# Patient Record
Sex: FEMALE | Race: WHITE | NOT HISPANIC OR LATINO | Employment: OTHER | URBAN - METROPOLITAN AREA
[De-identification: names, ages, dates, MRNs, and addresses within clinical notes are randomized per-mention and may not be internally consistent; named-entity substitution may affect disease eponyms.]

---

## 2017-03-22 ENCOUNTER — OFFICE VISIT (OUTPATIENT)
Dept: ORTHOPEDIC SURGERY | Facility: CLINIC | Age: 82
End: 2017-03-22

## 2017-03-22 VITALS — WEIGHT: 152 LBS | BODY MASS INDEX: 25.95 KG/M2 | HEIGHT: 64 IN

## 2017-03-22 DIAGNOSIS — M17.12 PRIMARY OSTEOARTHRITIS OF LEFT KNEE: Primary | ICD-10-CM

## 2017-03-22 PROCEDURE — 20610 DRAIN/INJ JOINT/BURSA W/O US: CPT | Performed by: ORTHOPAEDIC SURGERY

## 2017-03-22 RX ORDER — TRIAMTERENE AND HYDROCHLOROTHIAZIDE 37.5; 25 MG/1; MG/1
TABLET ORAL
Refills: 3 | COMMUNITY
Start: 2017-01-19

## 2017-03-22 RX ORDER — METOPROLOL SUCCINATE 50 MG/1
TABLET, EXTENDED RELEASE ORAL
Refills: 3 | COMMUNITY
Start: 2017-01-13

## 2017-03-22 RX ADMIN — BUPIVACAINE HYDROCHLORIDE 4 ML: 5 INJECTION, SOLUTION EPIDURAL; INTRACAUDAL at 13:47

## 2017-03-22 RX ADMIN — LIDOCAINE HYDROCHLORIDE 4 ML: 10 INJECTION, SOLUTION INFILTRATION; PERINEURAL at 13:47

## 2017-03-22 RX ADMIN — TRIAMCINOLONE ACETONIDE 80 MG: 40 INJECTION, SUSPENSION INTRA-ARTICULAR; INTRAMUSCULAR at 13:47

## 2017-04-04 RX ORDER — BUPIVACAINE HYDROCHLORIDE 5 MG/ML
4 INJECTION, SOLUTION EPIDURAL; INTRACAUDAL
Status: COMPLETED | OUTPATIENT
Start: 2017-03-22 | End: 2017-03-22

## 2017-04-04 RX ORDER — TRIAMCINOLONE ACETONIDE 40 MG/ML
80 INJECTION, SUSPENSION INTRA-ARTICULAR; INTRAMUSCULAR
Status: COMPLETED | OUTPATIENT
Start: 2017-03-22 | End: 2017-03-22

## 2017-04-04 RX ORDER — LIDOCAINE HYDROCHLORIDE 10 MG/ML
4 INJECTION, SOLUTION INFILTRATION; PERINEURAL
Status: COMPLETED | OUTPATIENT
Start: 2017-03-22 | End: 2017-03-22

## 2017-06-14 ENCOUNTER — OFFICE VISIT (OUTPATIENT)
Dept: ORTHOPEDIC SURGERY | Facility: CLINIC | Age: 82
End: 2017-06-14

## 2017-06-14 VITALS — WEIGHT: 154 LBS | BODY MASS INDEX: 26.29 KG/M2 | HEIGHT: 64 IN

## 2017-06-14 DIAGNOSIS — M17.12 PRIMARY OSTEOARTHRITIS OF LEFT KNEE: ICD-10-CM

## 2017-06-14 DIAGNOSIS — R52 PAIN: Primary | ICD-10-CM

## 2017-06-14 PROCEDURE — 73562 X-RAY EXAM OF KNEE 3: CPT | Performed by: ORTHOPAEDIC SURGERY

## 2017-06-14 PROCEDURE — 20610 DRAIN/INJ JOINT/BURSA W/O US: CPT | Performed by: ORTHOPAEDIC SURGERY

## 2017-06-14 PROCEDURE — 99213 OFFICE O/P EST LOW 20 MIN: CPT | Performed by: ORTHOPAEDIC SURGERY

## 2017-06-14 RX ADMIN — TRIAMCINOLONE ACETONIDE 80 MG: 40 INJECTION, SUSPENSION INTRA-ARTICULAR; INTRAMUSCULAR at 13:54

## 2017-06-14 RX ADMIN — BUPIVACAINE HYDROCHLORIDE 4 ML: 5 INJECTION, SOLUTION EPIDURAL; INTRACAUDAL at 13:54

## 2017-06-14 RX ADMIN — LIDOCAINE HYDROCHLORIDE 4 ML: 10 INJECTION, SOLUTION INFILTRATION; PERINEURAL at 13:54

## 2017-06-14 NOTE — PROGRESS NOTES
Subjective:     Patient ID: Coby Castañeda is a 89 y.o. female.    Chief Complaint:  Follow-up left knee pain, DJD  History of Present Illness  Coby Castañeda returns to clinic today for evaluation of left knee, last saw her back in March and injected her knee at that time, states that she still has gotten 90% relief of her pain for approximately 2 months that time but pain has significant recurred since then.  Rates currently as an 8-9 out of 10, aching in nature, localized anterior lateral aspects of left knee.  Exacerbated with prolonged standing, weightbearing, walking, minimal improvement with rest.  She has been having some falls recently and is thus, started into physical therapy program 5 weeks ago and she and her daughter have noted improvement in her strength, decreased pain, and decreased falls.      Social History     Occupational History   • Not on file.     Social History Main Topics   • Smoking status: Never Smoker   • Smokeless tobacco: Never Used   • Alcohol use No   • Drug use: No   • Sexual activity: Defer      Past Medical History:   Diagnosis Date   • Fracture of wrist    • Fracture, femur    • Heart failure    • Knee swelling    • Myocardial infarction      Past Surgical History:   Procedure Laterality Date   • ADENOIDECTOMY     • FRACTURE SURGERY     • PACEMAKER IMPLANTATION     • TONSILLECTOMY         Family History   Problem Relation Age of Onset   • Heart failure Mother          Review of Systems   Constitutional: Negative for chills, diaphoresis, fever and unexpected weight change.   HENT: Negative for hearing loss, nosebleeds, sore throat and tinnitus.    Eyes: Negative for pain and visual disturbance.   Respiratory: Negative for cough, shortness of breath and wheezing.    Cardiovascular: Negative for chest pain and palpitations.   Gastrointestinal: Negative for abdominal pain, diarrhea, nausea and vomiting.   Endocrine: Negative for cold intolerance, heat intolerance and polydipsia.  "  Genitourinary: Negative for difficulty urinating, dysuria and hematuria.   Musculoskeletal: Positive for arthralgias and myalgias. Negative for joint swelling.   Skin: Negative for rash and wound.   Allergic/Immunologic: Negative for environmental allergies.   Neurological: Negative for dizziness, syncope and numbness.   Hematological: Does not bruise/bleed easily.   Psychiatric/Behavioral: Negative for dysphoric mood and sleep disturbance. The patient is not nervous/anxious.            Objective:  Vitals:    06/14/17 1327   Weight: 154 lb (69.9 kg)   Height: 64\" (162.6 cm)     Last 2 weights    06/14/17  1327   Weight: 154 lb (69.9 kg)     Body mass index is 26.43 kg/(m^2).  General: No acute distress.  Resp: normal respiratory effort  Skin: no rashes or wounds; normal turgor  Psych: mood and affect appropriate; recent and remote memory intact         Ortho Exam    Left knee-active range of motion 5-120°, 4+ out of 5 strength on flexion and extension. Moderate effusion noted. Maximal tenderness palpation along lateral joint line, moderate tenderness along medial and lateral patellar facet with positive active patellar compression test. Stable to varus and valgus stress at 5 and 30°.   Imaging:  Left Knee X-Ray  Indication: Pain    AP, Lateral, and Milner views    Findings:  No fracture  No bony lesion  Normal soft tissues  Valgus alignment with advanced degenerative change intracompartmental osteoarthritis, bone-on-bone articulation of patellofemoral and lateral compartments noted, osteopenia noted and distal femur as well as proximal tibia.     No prior studies were available for comparison.    Assessment:       1. Pain    2. Primary osteoarthritis of left knee          Plan:  Large Joint Arthrocentesis  Date/Time: 6/14/2017 1:54 PM  Consent given by: patient  Site marked: site marked  Timeout: Immediately prior to procedure a time out was called to verify the correct patient, procedure, equipment, support " staff and site/side marked as required   Supporting Documentation  Indications: pain   Procedure Details  Location: knee - L knee  Preparation: Patient was prepped and draped in the usual sterile fashion  Needle size: 22 G  Approach: superior  Medications administered: 4 mL bupivacaine (PF) 0.5 %; 4 mL lidocaine 1 %; 80 mg triamcinolone acetonide 40 MG/ML  Patient tolerance: patient tolerated the procedure well with no immediate complications          ALEJANDRO query complete.          Discussed treatment options at length with patient at today's visit. Patient is not interested in any further surgical treatment at this point time, she has concerns about her medical risk.  She was to continue with physical therapy, given prior improvement with cortisone injection we'll proceed with that today.  We may also consider viscous supplement injections in the future if cortisone continues to decrease in efficacy.    Coby Castañeda was in agreement with plan and had all questions answered.     Orders:  Orders Placed This Encounter   Procedures   • Large Joint Arthrocentesis   • XR Knee 3 View Left       Medications:  No orders of the defined types were placed in this encounter.      Followup:  Return in about 3 months (around 9/14/2017).          Dragon transcription disclaimer     Much of this encounter note is an electronic transcription/translation of spoken language to printed text. The electronic translation of spoken language may permit erroneous, or at times, nonsensical words or phrases to be inadvertently transcribed. Although I have reviewed the note for such errors, some may still exist.

## 2017-06-18 RX ORDER — LIDOCAINE HYDROCHLORIDE 10 MG/ML
4 INJECTION, SOLUTION INFILTRATION; PERINEURAL
Status: COMPLETED | OUTPATIENT
Start: 2017-06-14 | End: 2017-06-14

## 2017-06-18 RX ORDER — BUPIVACAINE HYDROCHLORIDE 5 MG/ML
4 INJECTION, SOLUTION EPIDURAL; INTRACAUDAL
Status: COMPLETED | OUTPATIENT
Start: 2017-06-14 | End: 2017-06-14

## 2017-06-18 RX ORDER — TRIAMCINOLONE ACETONIDE 40 MG/ML
80 INJECTION, SUSPENSION INTRA-ARTICULAR; INTRAMUSCULAR
Status: COMPLETED | OUTPATIENT
Start: 2017-06-14 | End: 2017-06-14

## 2017-09-13 ENCOUNTER — OFFICE VISIT (OUTPATIENT)
Dept: ORTHOPEDIC SURGERY | Facility: CLINIC | Age: 82
End: 2017-09-13

## 2017-09-13 DIAGNOSIS — M17.12 PRIMARY OSTEOARTHRITIS OF LEFT KNEE: ICD-10-CM

## 2017-09-13 DIAGNOSIS — M70.61 TROCHANTERIC BURSITIS OF RIGHT HIP: ICD-10-CM

## 2017-09-13 DIAGNOSIS — R52 PAIN: Primary | ICD-10-CM

## 2017-09-13 PROCEDURE — 20610 DRAIN/INJ JOINT/BURSA W/O US: CPT | Performed by: ORTHOPAEDIC SURGERY

## 2017-09-13 PROCEDURE — 73502 X-RAY EXAM HIP UNI 2-3 VIEWS: CPT | Performed by: ORTHOPAEDIC SURGERY

## 2017-09-13 PROCEDURE — 99214 OFFICE O/P EST MOD 30 MIN: CPT | Performed by: ORTHOPAEDIC SURGERY

## 2017-09-13 RX ORDER — FLUOROURACIL 50 MG/G
CREAM TOPICAL
Refills: 0 | COMMUNITY
Start: 2017-09-06 | End: 2018-04-25

## 2017-09-13 RX ADMIN — TRIAMCINOLONE ACETONIDE 80 MG: 40 INJECTION, SUSPENSION INTRA-ARTICULAR; INTRAMUSCULAR at 14:40

## 2017-09-13 RX ADMIN — TRIAMCINOLONE ACETONIDE 80 MG: 40 INJECTION, SUSPENSION INTRA-ARTICULAR; INTRAMUSCULAR at 14:42

## 2017-09-13 RX ADMIN — LIDOCAINE HYDROCHLORIDE 2 ML: 10 INJECTION, SOLUTION INFILTRATION; PERINEURAL at 14:42

## 2017-09-13 RX ADMIN — BUPIVACAINE HYDROCHLORIDE 4 ML: 5 INJECTION, SOLUTION EPIDURAL; INTRACAUDAL at 14:40

## 2017-09-13 RX ADMIN — LIDOCAINE HYDROCHLORIDE 4 ML: 10 INJECTION, SOLUTION INFILTRATION; PERINEURAL at 14:40

## 2017-09-13 RX ADMIN — BUPIVACAINE HYDROCHLORIDE 2 ML: 5 INJECTION, SOLUTION EPIDURAL; INTRACAUDAL at 14:42

## 2017-09-13 NOTE — PROGRESS NOTES
Subjective:     Patient ID: Coby Castañeda is a 89 y.o. female.    Chief Complaint:  Follow-up left knee pain, DJD  New issue right lateral hip pain  History of Present Illness  Coby Castañeda returns to clinic today for evaluation of left knee, states last injection did provide her significant level relief but has noted over the last week to have significant increase in pain, return to baseline levels, rates as an 8-9 out of 10 and describes as aching in nature.  Continues to localized primarily to the anterior medial aspect of left knee, moderate associated swelling does wax and wane.  Denies any locking or catching the knee itself.  No fevers chills or sweats.  Denies any associated numbness or tingling.    In regards to right hip patient notes prior history of surgery for hip fracture, states she did fairly well from this but over the last 6-8 weeks is noted significant increase in pain over lateral aspect of the hip, worse with laying on her right side as well as with prolonged weightbearing particularly up and down inclines.  Denies any zack groin pain at this time.  Rates pain as a 6-7 out of 10, aching in nature, exacerbated with local pressure, minimal improvement with soft tissue massage.  Denies associated numbness or tingling down her right lower extremity.  Denies any redness, warmth, or issues with prior incisions.     Social History     Occupational History   • Not on file.     Social History Main Topics   • Smoking status: Never Smoker   • Smokeless tobacco: Never Used   • Alcohol use No   • Drug use: No   • Sexual activity: Defer      Past Medical History:   Diagnosis Date   • Cancer    • Fracture of wrist    • Fracture, femur    • Heart failure    • Knee swelling    • Myocardial infarction      Past Surgical History:   Procedure Laterality Date   • ADENOIDECTOMY     • FRACTURE SURGERY     • PACEMAKER IMPLANTATION     • TONSILLECTOMY         Family History   Problem Relation Age of Onset   • Heart  failure Mother          Review of Systems   Constitutional: Negative for chills, diaphoresis, fever and unexpected weight change.   HENT: Negative for hearing loss, nosebleeds, sore throat and tinnitus.    Eyes: Negative for pain and visual disturbance.   Respiratory: Negative for cough, shortness of breath and wheezing.    Cardiovascular: Negative for chest pain and palpitations.   Gastrointestinal: Negative for abdominal pain, diarrhea, nausea and vomiting.   Endocrine: Negative for cold intolerance, heat intolerance and polydipsia.   Genitourinary: Negative for difficulty urinating, dysuria and hematuria.   Musculoskeletal: Positive for joint swelling and myalgias. Negative for arthralgias.   Skin: Negative for rash and wound.   Allergic/Immunologic: Negative for environmental allergies.   Neurological: Negative for dizziness, syncope and numbness.   Hematological: Does not bruise/bleed easily.   Psychiatric/Behavioral: Negative for dysphoric mood and sleep disturbance. The patient is not nervous/anxious.    All other systems reviewed and are negative.          Objective:  There were no vitals filed for this visit.  There were no vitals filed for this visit.  There is no height or weight on file to calculate BMI.  General: No acute distress.  Resp: normal respiratory effort  Skin: no rashes or wounds; normal turgor  Psych: mood and affect appropriate; recent and remote memory intact         Ortho Exam    Left knee-active range of motion 0-115°, 4+ out of 5 strength on flexion and extension, moderate effusion noted.  Maximal tenderness palpation along medial joint line and posterior medial aspect left knee, stable to varus and valgus stress at 0 and 30°.    Right hip-maximal tenderness palpation over trochanteric bursa with positive Kathryn's test.  Active hip flexion 90°, external rotation 35°, internal rotation 15°, 4 out of 5 strength in all planes of motion, no significant pain to the groin region on logroll or  Stinchfield exam, negative straight leg raise right lower extremity.  Lateral incisions well-healed.    Imaging:  Right Hip X-Ray  Indication: Pain  AP pelvis and Frog Leg views    Findings:  Prior right hip cephalo-medullary nail as well as cerclage wires around the proximal shaft just distal to the lesser trochanter noted to be intact, good callus formation appreciated, shortened and varus position of femoral head and neck noted compared to contralateral hip.  Moderate degenerative change of femoral acetabular joint, apparent heterotopic overgrowth particularly of the trochanteric margin superiorly and medially with slightly similar appearance noted of left hip as well.  No evidence of failure of implant or perforation through the femoral head.  No prior studies were available for comparison.      Assessment:       1. Pain    2. Primary osteoarthritis of left knee    3. Trochanteric bursitis of right hip          Plan:  ALEJANDRO query complete.          Discussed treatment options at length with patient at today's visit. Given significant pain over the right lateral hip, patient did elect today to proceed with trochanteric bursa injection, we'll work on home exercises as well as foam roller and stretching to try to loosen the soft tissues laterally.    Discussed options as well regards to her left knee, reviewed option for total knee arthroplasty as well as bracing in formal therapy.  Patient was to proceed with repeat intra-articular cortisone injection today given prior success.  We will continue to work on home exercises for strengthening range of motion.    Coby Garry was in agreement with plan and had all questions answered.     Orders:  Orders Placed This Encounter   Procedures   • Large Joint Arthrocentesis   • Large Joint Arthrocentesis   • XR Hip With or Without Pelvis 2 - 3 View Right       Medications:  No orders of the defined types were placed in this encounter.      Followup:  Return in about 3  months (around 12/13/2017).      Large Joint Arthrocentesis  Date/Time: 9/13/2017 2:40 PM  Consent given by: patient  Site marked: site marked  Timeout: Immediately prior to procedure a time out was called to verify the correct patient, procedure, equipment, support staff and site/side marked as required   Supporting Documentation  Indications: pain   Procedure Details  Location: knee - L knee  Preparation: Patient was prepped and draped in the usual sterile fashion  Needle size: 22 G  Medications administered: 4 mL lidocaine 1 %; 4 mL bupivacaine (PF) 0.5 %; 80 mg triamcinolone acetonide 40 MG/ML  Patient tolerance: patient tolerated the procedure well with no immediate complications          Dragon transcription disclaimer     Much of this encounter note is an electronic transcription/translation of spoken language to printed text. The electronic translation of spoken language may permit erroneous, or at times, nonsensical words or phrases to be inadvertently transcribed. Although I have reviewed the note for such errors, some may still exist.

## 2017-09-13 NOTE — PROGRESS NOTES
Procedure   Large Joint Arthrocentesis  Date/Time: 9/13/2017 2:42 PM  Consent given by: patient  Site marked: site marked  Timeout: Immediately prior to procedure a time out was called to verify the correct patient, procedure, equipment, support staff and site/side marked as required   Supporting Documentation  Indications: pain   Procedure Details  Location: hip - R greater trochanteric bursa  Preparation: Patient was prepped and draped in the usual sterile fashion  Needle size: 22 G  Medications administered: 2 mL lidocaine 1 %; 2 mL bupivacaine (PF) 0.5 %; 80 mg triamcinolone acetonide 40 MG/ML  Patient tolerance: patient tolerated the procedure well with no immediate complications

## 2017-09-22 RX ORDER — LIDOCAINE HYDROCHLORIDE 10 MG/ML
4 INJECTION, SOLUTION INFILTRATION; PERINEURAL
Status: COMPLETED | OUTPATIENT
Start: 2017-09-13 | End: 2017-09-13

## 2017-09-22 RX ORDER — BUPIVACAINE HYDROCHLORIDE 5 MG/ML
4 INJECTION, SOLUTION EPIDURAL; INTRACAUDAL
Status: COMPLETED | OUTPATIENT
Start: 2017-09-13 | End: 2017-09-13

## 2017-09-22 RX ORDER — TRIAMCINOLONE ACETONIDE 40 MG/ML
80 INJECTION, SUSPENSION INTRA-ARTICULAR; INTRAMUSCULAR
Status: COMPLETED | OUTPATIENT
Start: 2017-09-13 | End: 2017-09-13

## 2017-09-22 RX ORDER — LIDOCAINE HYDROCHLORIDE 10 MG/ML
2 INJECTION, SOLUTION INFILTRATION; PERINEURAL
Status: COMPLETED | OUTPATIENT
Start: 2017-09-13 | End: 2017-09-13

## 2017-09-22 RX ORDER — BUPIVACAINE HYDROCHLORIDE 5 MG/ML
2 INJECTION, SOLUTION EPIDURAL; INTRACAUDAL
Status: COMPLETED | OUTPATIENT
Start: 2017-09-13 | End: 2017-09-13

## 2017-11-07 ENCOUNTER — TELEPHONE (OUTPATIENT)
Dept: ORTHOPEDIC SURGERY | Facility: CLINIC | Age: 82
End: 2017-11-07

## 2017-11-07 NOTE — TELEPHONE ENCOUNTER
We can only do cortisone every 3 months or it will worsen the arthritis, can try gel injections if she hasn't had those

## 2017-11-07 NOTE — TELEPHONE ENCOUNTER
Injections are only for 2 months, would it be possible for her to start getting them every 2 months instead of 3 months for pain relief?  States pt wants to start going in a mobile wheelchair due to the pain, and family is afraid if she stays in the mobile wheelchair, she will no longer be able to walk.  She does quite well with a walker until the pain intensifies every couple of months.

## 2017-12-07 ENCOUNTER — OFFICE VISIT (OUTPATIENT)
Dept: ORTHOPEDIC SURGERY | Facility: CLINIC | Age: 82
End: 2017-12-07

## 2017-12-07 DIAGNOSIS — M70.61 TROCHANTERIC BURSITIS OF RIGHT HIP: ICD-10-CM

## 2017-12-07 DIAGNOSIS — M17.12 PRIMARY OSTEOARTHRITIS OF LEFT KNEE: Primary | ICD-10-CM

## 2017-12-07 PROCEDURE — 99213 OFFICE O/P EST LOW 20 MIN: CPT | Performed by: ORTHOPAEDIC SURGERY

## 2017-12-07 PROCEDURE — 20610 DRAIN/INJ JOINT/BURSA W/O US: CPT | Performed by: ORTHOPAEDIC SURGERY

## 2017-12-07 RX ADMIN — LIDOCAINE HYDROCHLORIDE 4 ML: 10 INJECTION, SOLUTION EPIDURAL; INFILTRATION; INTRACAUDAL; PERINEURAL at 16:03

## 2017-12-07 RX ADMIN — LIDOCAINE HYDROCHLORIDE 2 ML: 10 INJECTION, SOLUTION EPIDURAL; INFILTRATION; INTRACAUDAL; PERINEURAL at 16:05

## 2017-12-07 RX ADMIN — TRIAMCINOLONE ACETONIDE 80 MG: 40 INJECTION, SUSPENSION INTRA-ARTICULAR; INTRAMUSCULAR at 16:05

## 2017-12-07 RX ADMIN — BUPIVACAINE HYDROCHLORIDE 2 ML: 5 INJECTION, SOLUTION EPIDURAL; INTRACAUDAL at 16:05

## 2017-12-07 RX ADMIN — TRIAMCINOLONE ACETONIDE 80 MG: 40 INJECTION, SUSPENSION INTRA-ARTICULAR; INTRAMUSCULAR at 16:03

## 2017-12-07 RX ADMIN — BUPIVACAINE HYDROCHLORIDE 4 ML: 5 INJECTION, SOLUTION EPIDURAL; INTRACAUDAL at 16:03

## 2017-12-07 NOTE — PROGRESS NOTES
Procedure   Large Joint Arthrocentesis  Date/Time: 12/7/2017 4:05 PM  Consent given by: patient  Site marked: site marked  Timeout: Immediately prior to procedure a time out was called to verify the correct patient, procedure, equipment, support staff and site/side marked as required   Supporting Documentation  Indications: pain   Procedure Details  Location: hip - R greater trochanteric bursa  Preparation: Patient was prepped and draped in the usual sterile fashion  Needle size: 22 G  Approach: lateral  Medications administered: 2 mL lidocaine PF 1% 1 %; 2 mL bupivacaine (PF) 0.5 %; 80 mg triamcinolone acetonide 40 MG/ML  Patient tolerance: patient tolerated the procedure well with no immediate complications

## 2017-12-07 NOTE — PROGRESS NOTES
Subjective:     Patient ID: Coby Castañeda is a 89 y.o. female.    Chief Complaint:  Follow-up left knee pain, DJD  Follow-up right hip pain, trochanteric bursitis  History of Present Illness  Coby Castañeda returns to clinic today for evaluation of left knee and right hip pain.  Both sites noted significant improvement with most recent injections with nearly 95% relief of pain at both sites.  She has had recurrence of pain over left knee over the last 2-3 weeks and over the right lateral hip over the last 4 weeks.  Denies any locking catching or giving way of bilateral lower extremities, denies any associated swelling.  No redness or warmth over knee or hip.  Denies any radiating pain down her right or left lower extremities.  Rates current level of pain as a 6-7 over the right hip and a out of 10 on the left knee.  Describes pain at both sites as aching in nature with increased pain on cross leg positioning on the right hip pain on prolonged standing and deep flexion on the left knee.     Social History     Occupational History   • Not on file.     Social History Main Topics   • Smoking status: Never Smoker   • Smokeless tobacco: Never Used   • Alcohol use No   • Drug use: No   • Sexual activity: Defer      Past Medical History:   Diagnosis Date   • Cancer    • Fracture of wrist    • Fracture, femur    • Heart failure    • Knee swelling    • Myocardial infarction      Past Surgical History:   Procedure Laterality Date   • ADENOIDECTOMY     • FRACTURE SURGERY     • PACEMAKER IMPLANTATION     • TONSILLECTOMY         Family History   Problem Relation Age of Onset   • Heart failure Mother          Review of Systems   Constitutional: Negative for chills, diaphoresis, fever and unexpected weight change.   HENT: Negative for hearing loss, nosebleeds, sore throat and tinnitus.    Eyes: Negative for pain and visual disturbance.   Respiratory: Negative for cough, shortness of breath and wheezing.    Cardiovascular: Negative  for chest pain and palpitations.   Gastrointestinal: Negative for abdominal pain, diarrhea, nausea and vomiting.   Endocrine: Negative for cold intolerance, heat intolerance and polydipsia.   Genitourinary: Negative for difficulty urinating, dysuria and hematuria.   Musculoskeletal: Positive for arthralgias. Negative for joint swelling and myalgias.   Skin: Negative for rash and wound.   Allergic/Immunologic: Negative for environmental allergies.   Neurological: Negative for dizziness, syncope and numbness.   Hematological: Does not bruise/bleed easily.   Psychiatric/Behavioral: Negative for dysphoric mood and sleep disturbance. The patient is not nervous/anxious.            Objective:  There were no vitals filed for this visit.  There were no vitals filed for this visit.  There is no height or weight on file to calculate BMI.  General: No acute distress.  Resp: normal respiratory effort  Skin: no rashes or wounds; normal turgor  Psych: mood and affect appropriate; recent and remote memory intact         Ortho Exam    Left knee-active range of motion 0-110°, 4+ out of 5 strength on flexion and extension, moderate effusion noted.  Maximal tenderness palpation along medial joint line and posterior medial aspect left knee, positive active patellar compression test, stable to varus and valgus stress at 0 and 30°.     Right hip-maximal tenderness palpation over trochanteric bursa with positive Kathryn's test.  Active hip flexion 90°, external rotation 30°, internal rotation 20°, 4 out of 5 strength in all planes of motion, no significant pain to the groin region on logroll or Stinchfield exam, negative straight leg raise right lower extremity.  Lateral incisions well-healed.    Imaging:    Assessment:       1. Primary osteoarthritis of left knee    2. Trochanteric bursitis of right hip          Plan:  ALEJANDRO query complete.          Discussed treatment options at length with patient at today's visit. Reviewed options  including total knee arthroplasty for left knee, intra-articular injection to left knee, repeat injection to right hip, physical therapy, MRI to evaluate for tendinopathy of right hip.  At this point in time patient is happy with the response from injections and elected to proceed with injection at both sites today.  She'll continue working on stretching program for the right hip and strengthening program for the left knee.    Coby Castañeda was in agreement with plan and had all questions answered.     Orders:  Orders Placed This Encounter   Procedures   • Large Joint Arthrocentesis   • Large Joint Arthrocentesis       Medications:  No orders of the defined types were placed in this encounter.      Followup:  Return in about 3 months (around 3/7/2018).    Coby was seen today for pain and pain.    Diagnoses and all orders for this visit:    Primary osteoarthritis of left knee    Trochanteric bursitis of right hip  -     Large Joint Arthrocentesis    Other orders  -     Large Joint Arthrocentesis          Dragon transcription disclaimer     Much of this encounter note is an electronic transcription/translation of spoken language to printed text. The electronic translation of spoken language may permit erroneous, or at times, nonsensical words or phrases to be inadvertently transcribed. Although I have reviewed the note for such errors, some may still exist.  Large Joint Arthrocentesis  Date/Time: 12/7/2017 4:03 PM  Consent given by: patient  Site marked: site marked  Timeout: Immediately prior to procedure a time out was called to verify the correct patient, procedure, equipment, support staff and site/side marked as required   Supporting Documentation  Indications: pain   Procedure Details  Location: knee - L knee  Preparation: Patient was prepped and draped in the usual sterile fashion  Needle size: 22 G  Approach: superior  Medications administered: 4 mL lidocaine PF 1% 1 %; 4 mL bupivacaine (PF) 0.5 %; 80 mg  triamcinolone acetonide 40 MG/ML  Patient tolerance: patient tolerated the procedure well with no immediate complications

## 2017-12-21 RX ORDER — BUPIVACAINE HYDROCHLORIDE 5 MG/ML
4 INJECTION, SOLUTION EPIDURAL; INTRACAUDAL
Status: COMPLETED | OUTPATIENT
Start: 2017-12-07 | End: 2017-12-07

## 2017-12-21 RX ORDER — LIDOCAINE HYDROCHLORIDE 10 MG/ML
2 INJECTION, SOLUTION EPIDURAL; INFILTRATION; INTRACAUDAL; PERINEURAL
Status: COMPLETED | OUTPATIENT
Start: 2017-12-07 | End: 2017-12-07

## 2017-12-21 RX ORDER — LIDOCAINE HYDROCHLORIDE 10 MG/ML
4 INJECTION, SOLUTION EPIDURAL; INFILTRATION; INTRACAUDAL; PERINEURAL
Status: COMPLETED | OUTPATIENT
Start: 2017-12-07 | End: 2017-12-07

## 2017-12-21 RX ORDER — BUPIVACAINE HYDROCHLORIDE 5 MG/ML
2 INJECTION, SOLUTION EPIDURAL; INTRACAUDAL
Status: COMPLETED | OUTPATIENT
Start: 2017-12-07 | End: 2017-12-07

## 2017-12-21 RX ORDER — TRIAMCINOLONE ACETONIDE 40 MG/ML
80 INJECTION, SUSPENSION INTRA-ARTICULAR; INTRAMUSCULAR
Status: COMPLETED | OUTPATIENT
Start: 2017-12-07 | End: 2017-12-07

## 2018-01-08 ENCOUNTER — TELEPHONE (OUTPATIENT)
Dept: ORTHOPEDIC SURGERY | Facility: CLINIC | Age: 83
End: 2018-01-08

## 2018-01-08 DIAGNOSIS — M17.12 PRIMARY OSTEOARTHRITIS OF LEFT KNEE: ICD-10-CM

## 2018-01-08 DIAGNOSIS — M70.61 TROCHANTERIC BURSITIS OF RIGHT HIP: Primary | ICD-10-CM

## 2018-03-08 ENCOUNTER — OFFICE VISIT (OUTPATIENT)
Dept: ORTHOPEDIC SURGERY | Facility: CLINIC | Age: 83
End: 2018-03-08

## 2018-03-08 DIAGNOSIS — M70.61 TROCHANTERIC BURSITIS OF RIGHT HIP: Primary | ICD-10-CM

## 2018-03-08 DIAGNOSIS — M54.16 LUMBAR RADICULOPATHY: ICD-10-CM

## 2018-03-08 DIAGNOSIS — M17.12 PRIMARY OSTEOARTHRITIS OF LEFT KNEE: ICD-10-CM

## 2018-03-08 PROCEDURE — 99213 OFFICE O/P EST LOW 20 MIN: CPT | Performed by: ORTHOPAEDIC SURGERY

## 2018-03-08 PROCEDURE — 20610 DRAIN/INJ JOINT/BURSA W/O US: CPT | Performed by: ORTHOPAEDIC SURGERY

## 2018-03-08 RX ORDER — LEVOTHYROXINE SODIUM 0.03 MG/1
TABLET ORAL
Refills: 5 | COMMUNITY
Start: 2018-03-01

## 2018-03-08 RX ADMIN — LIDOCAINE HYDROCHLORIDE 4 ML: 10 INJECTION, SOLUTION EPIDURAL; INFILTRATION; INTRACAUDAL; PERINEURAL at 14:14

## 2018-03-08 RX ADMIN — BUPIVACAINE HYDROCHLORIDE 4 ML: 5 INJECTION, SOLUTION EPIDURAL; INTRACAUDAL at 14:14

## 2018-03-08 RX ADMIN — TRIAMCINOLONE ACETONIDE 80 MG: 40 INJECTION, SUSPENSION INTRA-ARTICULAR; INTRAMUSCULAR at 14:14

## 2018-03-08 NOTE — PROGRESS NOTES
Subjective:     Patient ID: Coby Castañeda is a 89 y.o. female.    Chief Complaint:  Follow-up left knee pain, DJD  Follow-up right hip pain, trochanteric bursitis  History of Present Illness  Coby Castañeda returns to clinic today for evaluation of left knee and right hip.  She states that her last injection to her left knee worked very well for her, 90% relief of her pain for greater than 2-1/2 months, moderate recurrence pain at this point, over the last 2-3 weeks.  Localizes pain primarily to the medial aspect of the knee, moderate pain over the anterior knee, exacerbated with prolonged standing, walking, stair climbing.  Improvement with use of walker, rest, injections.    In regards to her right hip, she is noticed minimal short-term improvement with last injection of approximately 2 weeks.  She is now also noticing some increasing pain radiating down into her right lower extremity below level the knee with intermittent burning type symptoms noted, rates pain as a 7-8 out of 10, denies any associated numbness or tingling right lower extremity, denies any bowel or bladder issues at this point time, mild intermittent lumbar paraspinal pain noted as well but still notes the majority of her pain localized to the posterior lateral aspect the right hip.     Social History     Occupational History   • Not on file.     Social History Main Topics   • Smoking status: Never Smoker   • Smokeless tobacco: Never Used   • Alcohol use No   • Drug use: No   • Sexual activity: Defer      Past Medical History:   Diagnosis Date   • Cancer    • Fracture of wrist    • Fracture, femur    • Heart failure    • Knee swelling    • Myocardial infarction      Past Surgical History:   Procedure Laterality Date   • ADENOIDECTOMY     • FRACTURE SURGERY     • PACEMAKER IMPLANTATION     • TONSILLECTOMY         Family History   Problem Relation Age of Onset   • Heart failure Mother          Review of Systems   Constitutional: Negative for  chills, diaphoresis, fever and unexpected weight change.   HENT: Negative for hearing loss, nosebleeds, sore throat and tinnitus.    Eyes: Negative for pain and visual disturbance.   Respiratory: Negative for cough, shortness of breath and wheezing.    Cardiovascular: Negative for chest pain and palpitations.   Gastrointestinal: Negative for abdominal pain, diarrhea, nausea and vomiting.   Endocrine: Negative for cold intolerance, heat intolerance and polydipsia.   Genitourinary: Negative for difficulty urinating, dysuria and hematuria.   Musculoskeletal: Positive for arthralgias. Negative for joint swelling and myalgias.   Skin: Negative for rash and wound.   Allergic/Immunologic: Negative for environmental allergies.   Neurological: Negative for dizziness, syncope and numbness.   Hematological: Does not bruise/bleed easily.   Psychiatric/Behavioral: Negative for dysphoric mood and sleep disturbance. The patient is not nervous/anxious.            Objective:  There were no vitals filed for this visit.  There were no vitals filed for this visit.  There is no height or weight on file to calculate BMI.  General: No acute distress.  Resp: normal respiratory effort  Skin: no rashes or wounds; normal turgor  Psych: mood and affect appropriate; recent and remote memory intact         Ortho Exam    Left knee-active range of motion 2-115°, 4+ out of 5 strength on flexion and extension, moderate effusion noted, maximal tenderness palpation along medial joint line, moderate tenderness along medial lateral patellar facet with positive active patellar compression test.  Stable to varus and valgus stress at 2 and 30°.  No left hip pain on logroll or Stinchfield exam.    Right hip-maximal pain on straight leg raise testing with pain along the posterior lateral hip extending down the posterior lateral thigh and lateral aspect of the leg below level the knee, moderate residual tenderness over trochanteric bursa, mildly positive  Kathryn's test.  Stable hip range of motion comparison to prior exam with 4 out of 5 strength in all planes of motion.    Imaging:    Assessment:       1. Trochanteric bursitis of right hip    2. Primary osteoarthritis of left knee    3. Lumbar radiculopathy          Plan:          Discussed treatment options at length with patient at today's visit. Reviewed options including but not limited to total knee arthroplasty, activity modification, weight loss, anti-inflammatory medications, and repeat intra-articular injection as well as viscous supplement injection.  He would like to proceed with left knee cortisone injection this point time, she has noted minimal relief with use of meloxicam.    In regards to right hip she has not obtained any significant lasting improvement with prior injections, this point time we discussed option for Medrol Dosepak and she would like to proceed with that currently.    Given patient's significant increasing pain with failure other conservative treatment options for her right hip, her immobility is requiring frequent intermediate changes in position of her body weight is not feasible in ordinary bed because of the immobility related to her left knee degenerative joint disease as well as her right hip trochanteric bursitis and lumbar radiculopathy.    Coby Castañeda was in agreement with plan and had all questions answered.     Orders:  Orders Placed This Encounter   Procedures   • Large Joint Arthrocentesis       Medications:  No orders of the defined types were placed in this encounter.      Followup:  No Follow-up on file.    Coby was seen today for follow-up.    Diagnoses and all orders for this visit:    Trochanteric bursitis of right hip    Primary osteoarthritis of left knee    Lumbar radiculopathy    Other orders  -     Large Joint Arthrocentesis          Dictated utilizing Dragon dictation   Large Joint Arthrocentesis  Date/Time: 3/8/2018 2:14 PM  Consent given by: patient  Site  marked: site marked  Timeout: Immediately prior to procedure a time out was called to verify the correct patient, procedure, equipment, support staff and site/side marked as required   Supporting Documentation  Indications: pain   Procedure Details  Location: knee - L knee  Preparation: Patient was prepped and draped in the usual sterile fashion  Needle size: 22 G  Approach: anterolateral  Medications administered: 4 mL lidocaine PF 1% 1 %; 4 mL bupivacaine (PF) 0.5 %; 80 mg triamcinolone acetonide 40 MG/ML  Patient tolerance: patient tolerated the procedure well with no immediate complications

## 2018-03-09 ENCOUNTER — TELEPHONE (OUTPATIENT)
Dept: ORTHOPEDIC SURGERY | Facility: CLINIC | Age: 83
End: 2018-03-09

## 2018-03-09 RX ORDER — METHYLPREDNISOLONE 4 MG/1
TABLET ORAL
Qty: 1 TABLET | Refills: 0 | Status: SHIPPED | OUTPATIENT
Start: 2018-03-09 | End: 2018-04-25

## 2018-03-09 NOTE — TELEPHONE ENCOUNTER
Pt's daughter is calling, states you were going to send in a rx for her mother, but she checked with the pharmacy and it is not there and there is nothing in the chart about it.

## 2018-03-13 PROBLEM — M54.16 LUMBAR RADICULOPATHY: Status: ACTIVE | Noted: 2018-03-13

## 2018-03-13 RX ORDER — LIDOCAINE HYDROCHLORIDE 10 MG/ML
4 INJECTION, SOLUTION EPIDURAL; INFILTRATION; INTRACAUDAL; PERINEURAL
Status: COMPLETED | OUTPATIENT
Start: 2018-03-08 | End: 2018-03-08

## 2018-03-13 RX ORDER — BUPIVACAINE HYDROCHLORIDE 5 MG/ML
4 INJECTION, SOLUTION EPIDURAL; INTRACAUDAL
Status: COMPLETED | OUTPATIENT
Start: 2018-03-08 | End: 2018-03-08

## 2018-03-13 RX ORDER — TRIAMCINOLONE ACETONIDE 40 MG/ML
80 INJECTION, SUSPENSION INTRA-ARTICULAR; INTRAMUSCULAR
Status: COMPLETED | OUTPATIENT
Start: 2018-03-08 | End: 2018-03-08

## 2018-03-19 ENCOUNTER — TELEPHONE (OUTPATIENT)
Dept: ORTHOPEDIC SURGERY | Facility: CLINIC | Age: 83
End: 2018-03-19

## 2018-03-19 NOTE — TELEPHONE ENCOUNTER
Patient is having a lot of pain in her right hip and is having to use a wheelchair.  They have put a hold on PT for right now due to this.  Asking if something else could be done and if they should resume PT or hold off for now.        José Miguel [phone]

## 2018-03-20 NOTE — TELEPHONE ENCOUNTER
Hold off on PT, agree with wheelchair. May consider MRI to look for stress fracture if doesn't resolve with 5-7 days rest/wheelchair

## 2018-03-21 NOTE — TELEPHONE ENCOUNTER
Spoke with patient's daughter.  She says that Ms. Castañeda took the medrol dose pack and got some relief on day two and three, but other than that it has not helped.          Zenobia Burnham [phone]

## 2018-03-22 NOTE — TELEPHONE ENCOUNTER
Talked to patient's daughter, pain is slightly improved but still present.  We'll monitor pain levels for the next 5-7 days, if they are increasing may consider MRI to evaluate for stress reaction.

## 2018-04-09 ENCOUNTER — TELEPHONE (OUTPATIENT)
Dept: ORTHOPEDIC SURGERY | Facility: CLINIC | Age: 83
End: 2018-04-09

## 2018-04-09 DIAGNOSIS — M70.61 TROCHANTERIC BURSITIS OF RIGHT HIP: Primary | ICD-10-CM

## 2018-04-09 RX ORDER — TRAMADOL HYDROCHLORIDE 50 MG/1
50-100 TABLET ORAL EVERY 6 HOURS PRN
Qty: 40 TABLET | Refills: 0 | OUTPATIENT
Start: 2018-04-09 | End: 2018-04-25

## 2018-04-09 NOTE — TELEPHONE ENCOUNTER
Daughter called this morning, says that Ms. Castañeda is in excruciating pain from her hip, that she has never been in pain like this before.  Requesting an MRI asap and anything else that can be done for her.

## 2018-04-09 NOTE — TELEPHONE ENCOUNTER
They were unable to schedule MRI due to her having a pacemaker.    Can anything be given for the pain

## 2018-04-09 NOTE — TELEPHONE ENCOUNTER
I will order tramadol but if that doesn't work she needs to be seen, likely Fri. I have ordered a CT instead of MRI.

## 2018-04-10 ENCOUNTER — TELEPHONE (OUTPATIENT)
Dept: ORTHOPEDIC SURGERY | Facility: CLINIC | Age: 83
End: 2018-04-10

## 2018-04-10 ENCOUNTER — HOSPITAL ENCOUNTER (OUTPATIENT)
Dept: CT IMAGING | Facility: HOSPITAL | Age: 83
Discharge: HOME | End: 2018-04-10
Attending: ORTHOPAEDIC SURGERY | Admitting: ORTHOPAEDIC SURGERY

## 2018-04-10 DIAGNOSIS — M70.61 TROCHANTERIC BURSITIS OF RIGHT HIP: ICD-10-CM

## 2018-04-10 PROCEDURE — 73700 CT LOWER EXTREMITY W/O DYE: CPT

## 2018-04-10 NOTE — TELEPHONE ENCOUNTER
Patient is scheduled for the CT hip today (4/10). Do you need to see her back for the results or will you call them?   (You have 9 patients @ [facility] this week (btw 215-4pm) and 12 patient next Thursday @ [facility].)

## 2018-04-11 DIAGNOSIS — M54.16 LUMBAR RADICULOPATHY: Primary | ICD-10-CM

## 2018-04-11 RX ORDER — PREDNISONE 10 MG/1
TABLET ORAL
Qty: 40 TABLET | Refills: 0 | Status: SHIPPED | OUTPATIENT
Start: 2018-04-11 | End: 2018-04-25

## 2018-04-11 RX ORDER — MELOXICAM 7.5 MG/1
7.5 TABLET ORAL DAILY
Qty: 30 TABLET | Refills: 0 | Status: SHIPPED | OUTPATIENT
Start: 2018-04-11 | End: 2018-05-09 | Stop reason: SDUPTHER

## 2018-04-13 ENCOUNTER — TELEPHONE (OUTPATIENT)
Dept: ORTHOPEDIC SURGERY | Facility: CLINIC | Age: 83
End: 2018-04-13

## 2018-04-13 NOTE — TELEPHONE ENCOUNTER
Daughter was asking if you think Ms. Castañeda would benefit from seeing a spine specialist, and if so could you refer?    Also asking about possibly getting an order for a hospital bed if you think it will be beneficial to her.

## 2018-04-16 ENCOUNTER — TELEPHONE (OUTPATIENT)
Dept: ORTHOPEDIC SURGERY | Facility: CLINIC | Age: 83
End: 2018-04-16

## 2018-04-16 DIAGNOSIS — M16.11 PRIMARY OSTEOARTHRITIS OF RIGHT HIP: Primary | ICD-10-CM

## 2018-04-16 NOTE — TELEPHONE ENCOUNTER
Discussed patient's case with daughter today.  Given her persistent levels of pain we will try to get her set up for an ultrasound guided injection to right hip by Dr. Cunha.  Prescription written for chair lift and hospital bed.  Referral has been placed for pain management for consideration of lumbar epidural injections.

## 2018-04-16 NOTE — TELEPHONE ENCOUNTER
Patient has been taking meloxicam and steroid pack together.  Daughter called with an update to let Dr. Simmons know that she did see some improvement at first but is now back in terrible pain in her hip.

## 2018-04-19 ENCOUNTER — OFFICE VISIT (OUTPATIENT)
Dept: SPORTS MEDICINE | Facility: CLINIC | Age: 83
End: 2018-04-19

## 2018-04-19 VITALS — DIASTOLIC BLOOD PRESSURE: 70 MMHG | HEIGHT: 64 IN | SYSTOLIC BLOOD PRESSURE: 116 MMHG

## 2018-04-19 DIAGNOSIS — M25.551 PAIN OF RIGHT HIP JOINT: Primary | ICD-10-CM

## 2018-04-19 PROCEDURE — 20611 DRAIN/INJ JOINT/BURSA W/US: CPT | Performed by: FAMILY MEDICINE

## 2018-04-19 RX ORDER — TRIAMCINOLONE ACETONIDE 40 MG/ML
80 INJECTION, SUSPENSION INTRA-ARTICULAR; INTRAMUSCULAR ONCE
Status: COMPLETED | OUTPATIENT
Start: 2018-04-19 | End: 2018-04-19

## 2018-04-19 RX ADMIN — TRIAMCINOLONE ACETONIDE 80 MG: 40 INJECTION, SUSPENSION INTRA-ARTICULAR; INTRAMUSCULAR at 13:03

## 2018-04-19 NOTE — PROGRESS NOTES
"Here for R hip injection requested by Dr. Simmons.    Ultrasound-Guided Hip Injection Procedure Note    Right hip injection was discussed with the patient in detail, including indication, risks, benefits, and alternatives. Verbal consent was given for the procedure. Injection was performed by MD.  Injection site was identified by ultrasound examination, then cleaned with Betadine and alcohol swabs. Prior to needle insertion, ethyl chloride spray was used for surface anesthesia. Sterile technique was used. Ultrasound guidance was indicated due to proximity of neurovascular structures and injection accuracy.  A 22-gauge, 3.5\" spinal needle was guided to the anterior joint capsule under continuous direct ultrasound visualization. Injectate was seen filing the capsule and passed without difficulty. The needle was removed and a simple bandage was applied. The procedure was tolerated well without difficulty.    Injection mixture:  1% lidocaine without epinephrine: 2 mL  0.5% bupivacaine without epinephrine: 2 mL  40 mg/mL triamcinolone acetonide: 2 mL     Diagnoses and all orders for this visit:    Pain of right hip joint  -     triamcinolone acetonide (KENALOG-40) injection 80 mg; Inject 2 mL into the joint 1 (One) Time.       "

## 2018-04-25 ENCOUNTER — RESULTS ENCOUNTER (OUTPATIENT)
Dept: PAIN MEDICINE | Facility: CLINIC | Age: 83
End: 2018-04-25

## 2018-04-25 ENCOUNTER — OFFICE VISIT (OUTPATIENT)
Dept: PAIN MEDICINE | Facility: CLINIC | Age: 83
End: 2018-04-25

## 2018-04-25 VITALS
SYSTOLIC BLOOD PRESSURE: 142 MMHG | RESPIRATION RATE: 16 BRPM | OXYGEN SATURATION: 95 % | BODY MASS INDEX: 25.27 KG/M2 | TEMPERATURE: 98.2 F | WEIGHT: 148 LBS | HEART RATE: 76 BPM | HEIGHT: 64 IN | DIASTOLIC BLOOD PRESSURE: 79 MMHG

## 2018-04-25 DIAGNOSIS — M48.062 SPINAL STENOSIS OF LUMBAR REGION WITH NEUROGENIC CLAUDICATION: ICD-10-CM

## 2018-04-25 DIAGNOSIS — M54.16 LUMBAR RADICULOPATHY: ICD-10-CM

## 2018-04-25 DIAGNOSIS — G89.29 OTHER CHRONIC PAIN: ICD-10-CM

## 2018-04-25 DIAGNOSIS — G89.29 OTHER CHRONIC PAIN: Primary | ICD-10-CM

## 2018-04-25 PROCEDURE — 99203 OFFICE O/P NEW LOW 30 MIN: CPT | Performed by: ANESTHESIOLOGY

## 2018-04-25 RX ORDER — MELATONIN 10 MG
CAPSULE ORAL
COMMUNITY

## 2018-04-25 NOTE — PATIENT INSTRUCTIONS
--- Follow-up for procedure..... Right L4 LTFESI, x1    Given lumbar radiculopathy follows the right L4/L5 dermatome distribution, patient would likely benefit from a right L4/L5 transforaminal epidural injection.  The procedure was described in detail and the risks, benefits and alternatives were discussed with the patient (including but not limited to: bleeding, infection, nerve damage, worsening of pain, inability to perform injection, paralysis, seizures, and death) who agreed to proceed.

## 2018-04-25 NOTE — PROGRESS NOTES
"CHIEF COMPLAINT    New pt c/o low back and right hip pain. Pt states her pain intensified around the 1st of the year. Daughter states pt has never complained about anything and this was when she started to first c/o the intensity of the pain. She has had ongoing pain for the last two-three years in back and hip steadily increasing in this time. She broke her right hip after a fall in 2013 and in the last three years she has been having discomfort and pain also in the right knee where she had knee replacement 17 years ago. She had \"pins and screws, rods?\" put in her right hip after fracturing it. She has had steroid injections. She has had cortisone injections in left knee, right hip/thigh area and a week ago had injection in right hip with US machine. States this recent injection has helped to alleviate but not totally relieve pain. Daughter states she was on a 12 day does of Prednisone that she finished last week, as well as Meloxicam. She takes Meloxicam at night and takes Tylenol 650 mg during the day. She is concerned about interactions as well as what the affect of these medications may have on her organs. She tried Tramadol and that did not help for her pain. She has a pacemaker and the leads will not allow for an MRI. Describes it as an excruciating throbbing pain when it first started, but now it is more like a \"dull ache.\" She has done physical therapy but got to the point where it was hurting more than doing good. No c/o numbness in legs, but weakness where she has to  her legs to get into a vehicle or into bed. Walking aggravates pain and she can't stand for very long without it hurting. States with the last injection she got she can walk without too much pain. When she is laying down or sitting it is not as bad, after getting injection. Prior to injection she would wake up in the middle of the night with leg pain. States she had been using an \"ointmenet\" for her pain but didn't think it was doing " any good.     Subjective   Coby Castañeda is a 89 y.o. female.   She presents to the office for evaluation of back pain. She was referred here by Dr. Simmons.     I reviewed the CT images on the [employer] system as well as the report.... The severe spinal stenosis at L4-5 is seen on the CT scan.     Her pain tracks down through the ankle approximating a right L4 (+/- L5) dermatomal distribution.      She recently had hip injection with Dr. Cunha under fluoro guidance and this greatly decreased the right hip pain, but did not change the leg pain.          Back Pain   This is a chronic problem. The current episode started more than 1 year ago. The problem occurs constantly. The problem has been gradually worsening since onset. The pain is present in the lumbar spine. The quality of the pain is described as aching, burning and shooting. The pain radiates to the right foot. The pain is severe. The symptoms are aggravated by standing, position and twisting. Pertinent negatives include no chest pain, fever or headaches. She has tried analgesics, bed rest, heat and ice for the symptoms. The treatment provided no relief.        PEG Assessment   What number best describes your pain on average in the past week?7  What number best describes how, during the past week, pain has interfered with your enjoyment of life?5  What number best describes how, during the past week, pain has interfered with your general activity?  5        Current Outpatient Prescriptions:   •  levothyroxine (SYNTHROID, LEVOTHROID) 25 MCG tablet, TAKE 1 TABLET BY MOUTH EVERY MORNING ON AN EMPTY STOMACH, Disp: , Rfl: 5  •  Melatonin 10 MG capsule, Take  by mouth., Disp: , Rfl:   •  meloxicam (MOBIC) 7.5 MG tablet, Take 1 tablet by mouth Daily., Disp: 30 tablet, Rfl: 0  •  metoprolol succinate XL (TOPROL-XL) 50 MG 24 hr tablet, TAKE 1 TABLET BY MOUTH twice a day, Disp: , Rfl: 3  •  triamterene-hydrochlorothiazide (MAXZIDE-25) 37.5-25 MG per tablet, TAKE ONE  TABLET BY MOUTH EVERY DAY, Disp: , Rfl: 3    The following portions of the patient's history were reviewed and updated as appropriate: allergies, current medications, past family history, past medical history, past social history, past surgical history and problem list.      REVIEW OF PERTINENT MEDICAL DATA        RIGHT HIP CT WITHOUT CONTRAST     HISTORY: Right hip pain radiating to the lateral femur. Arthritis was  reportedly seen on x-ray.     TECHNIQUE: Axial CT of the pelvis and right hip was acquired. There is a  longstem right femoral gamma nail and total knee arthroplasty hardware  so the scan was extended to below the level of the knees. Multiplanar  reformatted images were produced. There is no previous exam for  comparison.     FINDINGS: The highest axial images along with reformatted images show  advanced degenerative disc and facet change in the lower lumbar spine  with severe spinal canal stenosis at L4-5. At L5-S1, the canal is mildly  narrowed.     The sacroiliac joints and the symphysis pubis show some degenerative  change. There is no evidence of fracture or bone lesion in the pelvis.  Osteoarthritic change is observed at both hips with joint space  narrowing most pronounced posteriorly. There are small marginal  osteophytes and there is some mild subcortical sclerosis at the femoral  heads. There is no CT evidence of osteonecrosis. There is no joint  effusion.     Longstem right femoral gamma nail is observed. There are also 2 cerclage  wires around the proximal femoral shaft and diametaphysis. There is an  old healed proximal femur fracture. No fracture nonunion or recurrent  defect is identified.     There is total knee arthroplasty hardware at the right knee. No joint  effusion is present.     Atrophic change os observed at the gluteus minimus muscles bilaterally.  Thigh musculature appears normal. No fluid collection or inflammatory  change is identified in either thigh.     In the left  hemipelvis in the region of the ovary, there is a 5.1 x 6.4  cm well-demarcated, fluid attenuation lesion that appears to be ovarian  in nature. The right ovary has a normal postmenopausal CT appearance.  Sigmoid colon diverticulosis is also incidentally noted.     IMPRESSION:  1. There is degenerative disc and facet change in the lumbar spine with  severe appearing spinal canal stenosis at L4-5.  2. A longstem right femoral gamma nail bridges old healed proximal right  femur fractures. There is some degenerative change at both hips but no  joint effusion or evidence of fracture nonunion or recurrent fracture in  the pelvis or the femurs.  3. There is a 6 cm cystic left adnexal lesion. No omental or peritoneal  lesions are identified in the pelvis. The CT appearance is not specific  but this is not considered a normal physiologic finding in this age  group. If further evaluation in this 89-year-old woman is desired,  pelvic sonogram and gynecologic follow-up could be considered.     Radiation dose reduction techniques were utilized, including automated  exposure control and exposure modulation based on body size.     This report was finalized on 4/10/2018 6:08 PM by Dr. Vijay Green MD.    Review of Systems   Constitutional: Positive for activity change. Negative for chills and fever.   Respiratory: Negative for apnea.    Cardiovascular: Negative for chest pain.   Gastrointestinal: Negative for constipation and diarrhea.   Genitourinary: Negative for difficulty urinating.   Musculoskeletal: Positive for back pain.   Skin: Negative for rash.   Allergic/Immunologic: Negative for immunocompromised state.   Neurological: Negative for dizziness, light-headedness and headaches.   Hematological: Does not bruise/bleed easily.   Psychiatric/Behavioral: Positive for sleep disturbance (pt uses melatonin to help her sleep). Negative for agitation, confusion and suicidal ideas. The patient is not nervous/anxious.   "        Vitals:    04/25/18 1043   BP: 142/79   Pulse: 76   Resp: 16   Temp: 98.2 °F (36.8 °C)   SpO2: 95%   Weight: 67.1 kg (148 lb)   Height: 162.6 cm (64.02\")   PainSc:   8   PainLoc: Back  Comment: and hip         Objective   Physical Exam   Constitutional: She is oriented to person, place, and time. She appears well-developed and well-nourished.   HENT:   Head: Normocephalic and atraumatic.   Eyes: Conjunctivae are normal. Pupils are equal, round, and reactive to light.   Cardiovascular: Normal rate, regular rhythm and normal heart sounds.    Pulmonary/Chest: Effort normal and breath sounds normal.   Abdominal: Soft. Bowel sounds are normal.   Musculoskeletal:        Lumbar back: She exhibits decreased range of motion and tenderness.   Neurological: She is alert and oriented to person, place, and time. She displays atrophy. Gait (walker) abnormal.   Reflex Scores:       Patellar reflexes are 0 on the right side and 0 on the left side.       Achilles reflexes are 0 on the right side and 0 on the left side.  SLR and fem stretch pos on right.  No pain on internal nor external rotation of the femur on the right today.   Psychiatric: She has a normal mood and affect. Her behavior is normal.   Nursing note and vitals reviewed.      Assessment/Plan   Coby was seen today for back pain and hip pain.    Diagnoses and all orders for this visit:    Other chronic pain  -     Oral Fluid Drug Screen - Saliva, Oral Cavity; Future    Spinal stenosis of lumbar region with neurogenic claudication  -     Oral Fluid Drug Screen - Saliva, Oral Cavity; Future    Lumbar radiculopathy  -     Case Request  -     Oral Fluid Drug Screen - Saliva, Oral Cavity; Future        --- Follow-up for procedure..... Right L4 LTFESI, x1    Given lumbar radiculopathy follows the right L4/L5 dermatome distribution, patient would likely benefit from a right L4/L5 transforaminal epidural injection.  The procedure was described in detail and the risks, " benefits and alternatives were discussed with the patient (including but not limited to: bleeding, infection, nerve damage, worsening of pain, inability to perform injection, paralysis, seizures, and death) who agreed to proceed.                    EMR Dragon/Transcription disclaimer:   Much of this encounter note is an electronic transcription/translation of spoken language to printed text. The electronic translation of spoken language may permit erroneous, or at times, nonsensical words or phrases to be inadvertently transcribed; Although I have reviewed the note for such errors, some may still exist.

## 2018-05-01 ENCOUNTER — OUTSIDE FACILITY SERVICE (OUTPATIENT)
Dept: PAIN MEDICINE | Facility: CLINIC | Age: 83
End: 2018-05-01

## 2018-05-01 ENCOUNTER — DOCUMENTATION (OUTPATIENT)
Dept: PAIN MEDICINE | Facility: CLINIC | Age: 83
End: 2018-05-01

## 2018-05-01 PROCEDURE — 64483 NJX AA&/STRD TFRM EPI L/S 1: CPT | Performed by: ANESTHESIOLOGY

## 2018-05-02 NOTE — PROGRESS NOTES
Lumbar Transforaminal Epidural Steroid Injection (one level Unilateral)  [facility]      PREOPERATIVE DIAGNOSIS:  Lumbar Spinal Stenosis WITH Neurogenic Claudication and right Lumbar Radiculopathy    POSTOPERATIVE DIAGNOSIS:  Same as preop diagnosis    PROCEDURE:  CPT 72471 --  Diagnostic Transforaminal Epidural Steroid Injection at the L4 level, on the right     PRE-PROCEDURE DISCUSSION WITH PATIENT:    Risks and complications were discussed with the patient prior to starting the procedure and informed consent was obtained.  We discussed various topics including but not limited to bleeding, infection, injury, nerve injury, paralysis, coma, death, postprocedural painful flare-up, postprocedural site soreness, and a lack of pain relief.  We discussed the diagnostic aspect of transforaminal epidural / selective nerve root blockade.    SURGEON:  Sb Gomez MD    REASON FOR PROCEDURE:    Diagnostic injection at this level is needed, Stenotic area is noted, and is likely contributing to this chronic &/or recurrent pain.  and Radicular pain pattern seems consistent with this dermatome.    SEDATION:  Patient declined administration of moderate sedation    ANESTHETIC:  Marcaine 0.25%  STEROID:  Methylprednisolone (DEPO MEDROL) 80mg/ml    DESCRIPTON OF PROCEDURE:  After obtaining informed consent, an I.V. was not started in the preoperative area. The patient taken to the operating room and was placed in the prone position with a pillow under the abdomen.  All pressure points were well padded.  EKG, blood pressure, and pulse oximeter were monitored.  The lumbar area was prepped with Chloraprep and draped in a sterile fashion. Under fluoroscopic guidance in an oblique dimension on the above mentioned side, the transverse process of the aforementioned vertebra at the junction of the body at 6 o'clock position was identified. Skin and subcutaneous tissue was anesthetized with 1% lidocaine. A 22-gauge  spinal needle was introduced under fluoroscopic guidance at the above junction into the foramen without parasthesias and into the epidural space. After confirming the position of the needle with PA, lateral, and oblique fluoroscopic views, aspiration was checked and was clear of blood or CSF.  Next, 1 mL of Omnipaque was injected. After seeing adequate spread on the corresponding nerve root, a total volume 3mL of injectate containing 1ml of the above mentioned local anesthetic, 1 ml saline,  and the above mentioned corticosteroid was injected into the epidural space.    The needle was removed intact.  Vital signs were stable throughout.        ESTIMATED BLOOD LOSS:  <5 mL  SPECIMENS:  none    COMPLICATIONS:   Patient had some element of postoperative numbness after the block. and The patient was reassured.    TOLERANCE & DISCHARGE CONDITION:    The patient tolerated the procedure well.  The patient was transported to the recovery area without difficulties.  The patient was discharged to home under the care of family in stable and satisfactory condition.    PLAN OF CARE:  1. The patient was given our standard instruction sheet.  2. The patient will Return to clinic 2 wks.  3. The patient will resume all medications as per the medication reconciliation sheet.

## 2018-05-09 RX ORDER — MELOXICAM 7.5 MG/1
7.5 TABLET ORAL DAILY
Qty: 30 TABLET | Refills: 0 | Status: SHIPPED | OUTPATIENT
Start: 2018-05-09 | End: 2018-06-01 | Stop reason: SDUPTHER

## 2018-05-14 ENCOUNTER — OFFICE VISIT (OUTPATIENT)
Dept: PAIN MEDICINE | Facility: CLINIC | Age: 83
End: 2018-05-14

## 2018-05-14 VITALS
OXYGEN SATURATION: 97 % | HEIGHT: 64 IN | WEIGHT: 148 LBS | BODY MASS INDEX: 25.27 KG/M2 | HEART RATE: 86 BPM | RESPIRATION RATE: 16 BRPM | DIASTOLIC BLOOD PRESSURE: 70 MMHG | TEMPERATURE: 97.6 F | SYSTOLIC BLOOD PRESSURE: 154 MMHG

## 2018-05-14 DIAGNOSIS — M48.062 SPINAL STENOSIS OF LUMBAR REGION WITH NEUROGENIC CLAUDICATION: ICD-10-CM

## 2018-05-14 DIAGNOSIS — M54.16 LUMBAR RADICULOPATHY: Primary | ICD-10-CM

## 2018-05-14 PROBLEM — M48.061 LUMBAR SPINAL STENOSIS: Status: ACTIVE | Noted: 2018-05-14

## 2018-05-14 PROCEDURE — 99214 OFFICE O/P EST MOD 30 MIN: CPT | Performed by: ANESTHESIOLOGY

## 2018-05-14 RX ORDER — ACETAMINOPHEN 500 MG
650 TABLET ORAL EVERY 6 HOURS PRN
COMMUNITY

## 2018-05-14 NOTE — PROGRESS NOTES
CHIEF COMPLAINT  Pt states she had no relief following R L4 TFESI  Pt also states her R leg pain ,to just above ankle,has increased significantly.      Subjective   Coby Castañeda is a 89 y.o. female  who presents to the office for follow-up of procedure.  She completed a right L4 TFESI   on  5-1-18 performed by Dr. Gomez for management of RLE pain. Patient reports no relief from the procedure.     She has a [employer] implanted pacemaker and we discussed implantable pain therapies as a comparison to this.    History of Present Illness     PEG Assessment   What number best describes your pain on average in the past week?8  What number best describes how, during the past week, pain has interfered with your enjoyment of life?9  What number best describes how, during the past week, pain has interfered with your general activity?  9      The following portions of the patient's history were reviewed and updated as appropriate: allergies, current medications, past family history, past medical history, past social history, past surgical history and problem list.    Review of Systems   Constitutional: Positive for activity change (uses W/C extensively). Negative for chills and fever.   Respiratory: Negative for apnea.    Cardiovascular: Negative for chest pain.   Gastrointestinal: Negative for constipation, diarrhea and nausea.   Genitourinary: Negative for difficulty urinating.   Musculoskeletal: Positive for gait problem (due to R leg pain). Negative for back pain.   Skin: Negative for rash.   Allergic/Immunologic: Negative for immunocompromised state.   Neurological: Positive for weakness (R leg). Negative for numbness and headaches.   Hematological: Does not bruise/bleed easily.   Psychiatric/Behavioral: Positive for sleep disturbance (pt uses melatonin to help her sleep). Negative for agitation, confusion and suicidal ideas. The patient is not nervous/anxious.          --    RIGHT HIP CT WITHOUT  CONTRAST     HISTORY: Right hip pain radiating to the lateral femur. Arthritis was  reportedly seen on x-ray.     TECHNIQUE: Axial CT of the pelvis and right hip was acquired. There is a  longstem right femoral gamma nail and total knee arthroplasty hardware  so the scan was extended to below the level of the knees. Multiplanar  reformatted images were produced. There is no previous exam for  comparison.     FINDINGS: The highest axial images along with reformatted images show  advanced degenerative disc and facet change in the lower lumbar spine  with severe spinal canal stenosis at L4-5. At L5-S1, the canal is mildly  narrowed.     The sacroiliac joints and the symphysis pubis show some degenerative  change. There is no evidence of fracture or bone lesion in the pelvis.  Osteoarthritic change is observed at both hips with joint space  narrowing most pronounced posteriorly. There are small marginal  osteophytes and there is some mild subcortical sclerosis at the femoral  heads. There is no CT evidence of osteonecrosis. There is no joint  effusion.     Longstem right femoral gamma nail is observed. There are also 2 cerclage  wires around the proximal femoral shaft and diametaphysis. There is an  old healed proximal femur fracture. No fracture nonunion or recurrent  defect is identified.     There is total knee arthroplasty hardware at the right knee. No joint  effusion is present.     Atrophic change os observed at the gluteus minimus muscles bilaterally.  Thigh musculature appears normal. No fluid collection or inflammatory  change is identified in either thigh.     In the left hemipelvis in the region of the ovary, there is a 5.1 x 6.4  cm well-demarcated, fluid attenuation lesion that appears to be ovarian  in nature. The right ovary has a normal postmenopausal CT appearance.  Sigmoid colon diverticulosis is also incidentally noted.     IMPRESSION:  1. There is degenerative disc and facet change in the lumbar  "spine with  severe appearing spinal canal stenosis at L4-5.  2. A longstem right femoral gamma nail bridges old healed proximal right  femur fractures. There is some degenerative change at both hips but no  joint effusion or evidence of fracture nonunion or recurrent fracture in  the pelvis or the femurs.  3. There is a 6 cm cystic left adnexal lesion. No omental or peritoneal  lesions are identified in the pelvis. The CT appearance is not specific  but this is not considered a normal physiologic finding in this age  group. If further evaluation in this 89-year-old woman is desired,  pelvic sonogram and gynecologic follow-up could be considered.     Radiation dose reduction techniques were utilized, including automated  exposure control and exposure modulation based on body size.     This report was finalized on 4/10/2018 6:08 PM by Dr. Vijay Green MD.    --    Vitals:    05/14/18 1344   BP: 154/70   Pulse: 86   Resp: 16   Temp: 97.6 °F (36.4 °C)   SpO2: 97%   Weight: 67.1 kg (148 lb)   Height: 162.5 cm (63.98\")   PainSc: 7  Comment: R leg pain ranges from 7-8/10   PainLoc: Leg         Objective   Physical Exam        Assessment/Plan   Coby was seen today for leg pain.    Diagnoses and all orders for this visit:    Lumbar radiculopathy    Spinal stenosis of lumbar region with neurogenic claudication      Education was 30 minutes of this 35 minute office visit.  As she is not an ideal surgical candidate and basic interventions do not show promise, we discussed neuromodulation.    ----------  Education about SCS therapy:    -  This was an extended office visit in which we entered into discussion about advanced pain relieving techniques, and discussed implantable pain therapies.  We discussed advanced neuromodulation in the form of Spinal Cord Stimulation.  This is a reasonable therapy for patients who have exhausted basic nonnarcotic options, basic modalities and physical therapies, and do not have any other " reasonable surgical options.  This therapy as an alternative to long term high dose opioid therapy.    -  Risks include but are not limited to bleeding, infection, injury, paralysis, nerve injury, dural puncture, and risk for postprocedural pain.  Implanted equipment risks include but are not limited to lead migration, lead fracture, risk of loss of pain relieving stimulation, risk of electrical shock, and risk of system failure.    - We discussed the theory and basic science behind SCS therapy including but not limited to energy delivery and relevant anatomy, in terms that are easy to understand and also with use of illustrative devices.  Spinal Cord Stimulation therapies apply an electromagnetic field to a specific area on the spinal cord (Dorsal Column) to attempt to block transmission of painful signals from the peripheral nerves to the brain.    -  We discussed that prior to trialing, I request that patients review relevant materials and perform some research, and also have a follow up education session with a device specialist from the .  Also, insurance requires a presurgical psychological evaluation.  When these have been completed, and all the patient's questions have been answered to their satisfaction, then we will plan to request authorization for trialing.   - We discussed the trialing process (aka Phase 1)  that usually lasts a week, and the temporary nature of this trial.  Trial success will determine whether or not we proceed to implant.  We discussed reasonable expectations, and that I feel that consistent 50% pain relief is medically successful and is a reasonable expectation to justify moving forward to permanent implant.    -  Additional risks of Phase 1 include but are not limited to bleeding on insertion, bleeding on lead removal, and procedural site soreness.  - We discussed the percutaneous surgical implant, including postsurgical restrictions, risks, and alternatives.   For  spinal cord stimulation implanted device (aka SCS Phase 2) there is usually a midline vertical incision for the spinally implanted leads, and also a horizontal incision in the posterior lumbar flank for implantation of the battery & computer (aka IPG).  The leads are tunneled from the midline incision to the medial aspect of the battery pocket incision.    -  Postoperative restrictions include limiting the following activity as much as possible for 90 days:  Lifting >10 lbs, bending at the waist, stretching/reaching overhead, and twisting.  ----------      --- Follow-up in the future for potential education session regarding Spinal cord stimulation therapies  --- given brochures / literature about these devices           ALEJANDRO REPORT  ALEJANDRO report has been reviewed and scanned into the patient's chart.  Date of last ALEJANDRO : as above.  No current use of opioids.          EMR Dragon/Transcription disclaimer:   Much of this encounter note is an electronic transcription/translation of spoken language to printed text. The electronic translation of spoken language may permit erroneous, or at times, nonsensical words or phrases to be inadvertently transcribed; Although I have reviewed the note for such errors, some may still exist.

## 2018-06-01 RX ORDER — MELOXICAM 7.5 MG/1
7.5 TABLET ORAL DAILY
Qty: 30 TABLET | Refills: 0 | Status: SHIPPED | OUTPATIENT
Start: 2018-06-01 | End: 2018-06-29 | Stop reason: SDUPTHER

## 2018-06-29 RX ORDER — MELOXICAM 7.5 MG/1
7.5 TABLET ORAL DAILY
Qty: 30 TABLET | Refills: 0 | Status: SHIPPED | OUTPATIENT
Start: 2018-06-29 | End: 2018-08-05 | Stop reason: SDUPTHER

## 2018-07-17 ENCOUNTER — OFFICE VISIT (OUTPATIENT)
Dept: ORTHOPEDIC SURGERY | Facility: CLINIC | Age: 83
End: 2018-07-17

## 2018-07-17 DIAGNOSIS — M17.12 PRIMARY OSTEOARTHRITIS OF LEFT KNEE: ICD-10-CM

## 2018-07-17 DIAGNOSIS — R52 PAIN: Primary | ICD-10-CM

## 2018-07-17 PROCEDURE — 99213 OFFICE O/P EST LOW 20 MIN: CPT | Performed by: NURSE PRACTITIONER

## 2018-07-17 PROCEDURE — 73562 X-RAY EXAM OF KNEE 3: CPT | Performed by: NURSE PRACTITIONER

## 2018-07-17 PROCEDURE — 20610 DRAIN/INJ JOINT/BURSA W/O US: CPT | Performed by: NURSE PRACTITIONER

## 2018-07-17 RX ORDER — FUROSEMIDE 20 MG/1
TABLET ORAL
Refills: 0 | COMMUNITY
Start: 2018-06-25

## 2018-07-17 RX ORDER — LIDOCAINE HYDROCHLORIDE 10 MG/ML
4 INJECTION, SOLUTION EPIDURAL; INFILTRATION; INTRACAUDAL; PERINEURAL
Status: COMPLETED | OUTPATIENT
Start: 2018-07-17 | End: 2018-07-17

## 2018-07-17 RX ORDER — TRIAMCINOLONE ACETONIDE 40 MG/ML
40 INJECTION, SUSPENSION INTRA-ARTICULAR; INTRAMUSCULAR
Status: COMPLETED | OUTPATIENT
Start: 2018-07-17 | End: 2018-07-17

## 2018-07-17 RX ADMIN — LIDOCAINE HYDROCHLORIDE 4 ML: 10 INJECTION, SOLUTION EPIDURAL; INFILTRATION; INTRACAUDAL; PERINEURAL at 11:57

## 2018-07-17 RX ADMIN — TRIAMCINOLONE ACETONIDE 40 MG: 40 INJECTION, SUSPENSION INTRA-ARTICULAR; INTRAMUSCULAR at 11:57

## 2018-07-17 NOTE — PROGRESS NOTES
Subjective:     Patient ID: Coby Castañeda is a 89 y.o. female.    Chief Complaint: Follow-up left knee pain, DJD    History of Present Illness    [facility] of left knee pain.  She's been seen by Dr. Simmons in past for his first time evaluation this APRN.  Pain present over the lateral medial joint lines and states that she did not seem to receive as much relief as she has with previous injections at the left knee that was provided on March 8, 2018.  Has continued to experience pain over the medial and lateral joint lines, worse at medial joint line.  Pain increases with long periods of standing, ambulating, a sitting and descending stairs.  Rates pain at a 7-8 out of a 10 and is very concerned that the steroid injections are not working at this point as well as they have in the past.  Denies that she is experiencing numbness or tingling pain down her left lower extremity.  Denies the pain is radiating up into her left hip at this time.  Denies all other concerns at this time.     Social History     Occupational History   • Not on file.     Social History Main Topics   • Smoking status: Never Smoker   • Smokeless tobacco: Never Used   • Alcohol use No   • Drug use: No   • Sexual activity: Defer      Past Medical History:   Diagnosis Date   • Cancer (CMS/HCC)     skin cancer on arm   • Fracture of wrist    • Fracture, femur (CMS/HCC)    • Heart failure (CMS/HCC)    • Knee swelling    • Myocardial infarction      Past Surgical History:   Procedure Laterality Date   • ADENOIDECTOMY     • CHOLECYSTECTOMY     • FRACTURE SURGERY     • OOPHORECTOMY     • PACEMAKER IMPLANTATION     • REPLACEMENT TOTAL KNEE Right    • TONSILLECTOMY         Family History   Problem Relation Age of Onset   • Heart failure Mother          Review of Systems   Constitutional: Negative for chills, diaphoresis, fever and unexpected weight change.   HENT: Negative for hearing loss, nosebleeds, sore throat  and tinnitus.    Eyes: Negative for pain and visual disturbance.   Respiratory: Negative for cough, shortness of breath and wheezing.    Cardiovascular: Negative for chest pain and palpitations.   Gastrointestinal: Negative for abdominal pain, diarrhea, nausea and vomiting.   Endocrine: Negative for cold intolerance, heat intolerance and polydipsia.   Genitourinary: Negative for difficulty urinating, dysuria and hematuria.   Musculoskeletal: Positive for arthralgias. Negative for joint swelling and myalgias.   Skin: Negative for rash and wound.   Allergic/Immunologic: Negative for environmental allergies.   Neurological: Negative for dizziness, syncope and numbness.   Hematological: Does not bruise/bleed easily.   Psychiatric/Behavioral: Negative for dysphoric mood and sleep disturbance. The patient is not nervous/anxious.    All other systems reviewed and are negative.          Objective:  Physical Exam  General: No acute distress.  Eyes: conjunctiva clear; pupils equally round and reactive  ENT: external ears and nose atraumatic; oropharynx clear  CV: no peripheral edema  Resp: normal respiratory effort  Skin: no rashes or wounds; normal turgor  Psych: mood and affect appropriate; recent and remote memory intact    There were no vitals filed for this visit.  There were no vitals filed for this visit.  There is no height or weight on file to calculate BMI.     Left Knee Exam     Tenderness   The patient is experiencing tenderness in the medial joint line, patella and lateral joint line.    Range of Motion   Extension: 5   Flexion: 110     Tests   Josie:  Medial - negative Lateral - negative  Varus: negative  Valgus: negative  Patellar Apprehension: negative    Other   Erythema: absent  Scars: absent  Sensation: normal  Pulse: present  Swelling: moderate    Comments:  Positive active patellar compression test              Imaging:  Left Knee X-Ray  Indication: Pain    AP, Lateral, and [place]  views    Findings:  No fracture  Normal soft tissues  Severe tricompartmental osteoarthritis    Prior studies were available for comparison.    Assessment:       1. Pain    2. Primary osteoarthritis of left knee          Plan:  1.  Discussed plan of care with patient and her son.  Wishes to proceed with corticosteroid injection at her left knee.  We'll plan to follow up with her to start Visco supplement injections in approximately 4-6 weeks.  Patient verbalized understanding of all information agrees with plan of care.  Denies other concerns present this time.    Large Joint Arthrocentesis  Date/Time: 7/17/2018 11:57 AM  Consent given by: patient  Site marked: site marked  Timeout: Immediately prior to procedure a time out was called to verify the correct patient, procedure, equipment, support staff and site/side marked as required   Supporting Documentation  Indications: pain   Procedure Details  Location: knee - L knee  Preparation: Patient was prepped and draped in the usual sterile fashion  Needle size: 22 G  Approach: anterolateral  Medications administered: 4 mL lidocaine PF 1% 1 %; 40 mg triamcinolone acetonide 40 MG/ML  Patient tolerance: patient tolerated the procedure well with no immediate complications          Orders:  Orders Placed This Encounter   Procedures   • Large Joint Arthrocentesis   • XR Knee 3 View Left       Dictated utilizing Dragon dictation

## 2018-08-06 RX ORDER — MELOXICAM 7.5 MG/1
TABLET ORAL
Qty: 30 TABLET | Refills: 0 | Status: SHIPPED | OUTPATIENT
Start: 2018-08-06 | End: 2018-08-30 | Stop reason: SDUPTHER

## 2018-08-21 ENCOUNTER — OFFICE VISIT (OUTPATIENT)
Dept: ORTHOPEDIC SURGERY | Facility: CLINIC | Age: 83
End: 2018-08-21

## 2018-08-21 VITALS — WEIGHT: 152 LBS | HEIGHT: 64 IN | BODY MASS INDEX: 25.95 KG/M2

## 2018-08-21 DIAGNOSIS — M17.12 PRIMARY OSTEOARTHRITIS OF LEFT KNEE: Primary | ICD-10-CM

## 2018-08-21 PROCEDURE — 99212 OFFICE O/P EST SF 10 MIN: CPT | Performed by: NURSE PRACTITIONER

## 2018-08-21 PROCEDURE — 20610 DRAIN/INJ JOINT/BURSA W/O US: CPT | Performed by: NURSE PRACTITIONER

## 2018-08-21 NOTE — PROGRESS NOTES
Subjective:     Patient ID: Coby Castañeda is a 89 y.o. female.    Chief Complaint:  Follow-up primary osteoarthritis left knee    History of Present Illness    Ms. Castañeda presents back to clinic for follow-up left knee.  Continues to experience pain over the left knee, medial joint line, patella, lateral joint line.  Increased pain noted with change of position such as from seated to standing and attempting to walk.  Reports that she receive mild symptom relief for the first few weeks after she received a steroid injection last back on July 17, 2018.  She presents today with like to discuss starting the Visco supplement injections that were previously discussed at last visit.  Initially presented to this APRN for the first time for evaluation of the left knee on 7/17/2018.Pain present over the lateral medial joint lines and states that she did not seem to receive as much relief as she has with previous injections at the left knee that was provided on March 8, 2018.  Has continued to experience pain over the medial and lateral joint lines, worse at medial joint line.  Pain increases with long periods of standing, ambulating, a sitting and descending stairs.  Rates pain at a 7-8 out of a 10 and is very concerned that the steroid injections are not working at this point as well as they have in the past.  Denies that she is experiencing numbness or tingling pain down her left lower extremity.  Denies the pain is radiating up into her left hip at this time.  Denies all other concerns at this time.          Social History     Occupational History   • Not on file.     Social History Main Topics   • Smoking status: Never Smoker   • Smokeless tobacco: Never Used   • Alcohol use No   • Drug use: No   • Sexual activity: Defer      Past Medical History:   Diagnosis Date   • Cancer (CMS/HCC)     skin cancer on arm   • Fracture of wrist    • Fracture, femur (CMS/HCC)    • Heart failure (CMS/HCC)    • Knee swelling    •  "Myocardial infarction      Past Surgical History:   Procedure Laterality Date   • ADENOIDECTOMY     • CHOLECYSTECTOMY     • FRACTURE SURGERY     • OOPHORECTOMY     • PACEMAKER IMPLANTATION     • REPLACEMENT TOTAL KNEE Right    • TONSILLECTOMY         Family History   Problem Relation Age of Onset   • Heart failure Mother          Review of Systems   Constitutional: Negative for chills, diaphoresis, fever and unexpected weight change.   HENT: Negative for hearing loss, nosebleeds, sore throat and tinnitus.    Eyes: Negative for pain and visual disturbance.   Respiratory: Negative for cough, shortness of breath and wheezing.    Cardiovascular: Negative for chest pain and palpitations.   Gastrointestinal: Negative for abdominal pain, diarrhea, nausea and vomiting.   Endocrine: Negative for cold intolerance, heat intolerance and polydipsia.   Genitourinary: Negative for difficulty urinating, dysuria and hematuria.   Musculoskeletal: Positive for arthralgias and myalgias. Negative for joint swelling.   Skin: Negative for rash and wound.   Allergic/Immunologic: Negative for environmental allergies.   Neurological: Negative for dizziness, syncope and numbness.   Hematological: Does not bruise/bleed easily.   Psychiatric/Behavioral: Negative for dysphoric mood and sleep disturbance. The patient is not nervous/anxious.            Objective:  Physical Exam     General: No acute distress.  Eyes: conjunctiva clear; pupils equally round and reactive  ENT: external ears and nose atraumatic; oropharynx clear  CV: no peripheral edema  Resp: normal respiratory effort  Skin: no rashes or wounds; normal turgor  Psych: mood and affect appropriate; recent and remote memory intact    Vitals:    08/21/18 1024   Weight: 68.9 kg (152 lb)   Height: 162.5 cm (63.98\")     1    08/21/18  1024   Weight: 68.9 kg (152 lb)     Body mass index is 26.11 kg/m².     Ortho Exam      Left Knee Exam      Tenderness   The patient is experiencing tenderness " in the medial joint line, patella and lateral joint line.     Range of Motion   Extension: 5   Flexion: 110      Tests   Josie:  Medial - negative Lateral - negative  Varus: negative  Valgus: negative  Patellar Apprehension: negative     Other   Erythema: absent  Scars: absent  Sensation: normal  Pulse: present  Swelling: moderate     Comments:  Positive active patellar compression test    Assessment:       1. Primary osteoarthritis of left knee          Plan:  1.  Discussed plan of care with patient and her family.  We'll plan to start Visco supplement injections #1 at today's visit.  We'll plan to see her back in 1 week for the second injection and the following week for the third injection to the left knee.  Encouraged to kick knee back and forth if she notices increased swelling pain apply ice.  We'll plan to see her back in 1 week.  Patient verbalized understanding of all information agrees with plan of care.  Denies other concerns present time.  Orders:  Orders Placed This Encounter   Procedures   • Large Joint Arthrocentesis     Dictated utilizing [employer] dictation   Large Joint Arthrocentesis  Date/Time: 8/21/2018 10:40 AM  Consent given by: patient  Site marked: site marked  Timeout: Immediately prior to procedure a time out was called to verify the correct patient, procedure, equipment, support staff and site/side marked as required   Supporting Documentation  Indications: pain   Procedure Details  Location: knee - L knee  Preparation: Patient was prepped and draped in the usual sterile fashion  Needle size: 22 G  Approach: anterolateral  Medications administered: 30 mg Hyaluronan 30 MG/2ML  Patient tolerance: patient tolerated the procedure well with no immediate complications

## 2018-08-28 ENCOUNTER — CLINICAL SUPPORT (OUTPATIENT)
Dept: ORTHOPEDIC SURGERY | Facility: CLINIC | Age: 83
End: 2018-08-28

## 2018-08-28 DIAGNOSIS — R52 PAIN: Primary | ICD-10-CM

## 2018-08-28 DIAGNOSIS — M17.12 PRIMARY OSTEOARTHRITIS OF LEFT KNEE: ICD-10-CM

## 2018-08-28 PROCEDURE — 20610 DRAIN/INJ JOINT/BURSA W/O US: CPT | Performed by: NURSE PRACTITIONER

## 2018-08-28 NOTE — PROGRESS NOTES
Procedure   Large Joint Arthrocentesis  Date/Time: 8/28/2018 11:28 AM  Consent given by: patient  Site marked: site marked  Supporting Documentation  Indications: pain   Procedure Details  Location: knee - L knee (left)  Preparation: Patient was prepped and draped in the usual sterile fashion  Needle size: 22 G  Approach: anterolateral  Medications administered: 30 mg Hyaluronan 30 MG/2ML  Patient tolerance: patient tolerated the procedure well with no immediate complications          Patient presented today for viscosupplement injection.  This is the second injection for the left knee.  We will see patient back in one week.  We reviewed risks benefits and alternatives of the procedure and patient wished to proceed today and had all questions answered.

## 2018-08-30 RX ORDER — MELOXICAM 7.5 MG/1
7.5 TABLET ORAL DAILY
Qty: 30 TABLET | Refills: 0 | Status: SHIPPED | OUTPATIENT
Start: 2018-08-30 | End: 2019-01-08 | Stop reason: SDUPTHER

## 2018-09-04 ENCOUNTER — CLINICAL SUPPORT (OUTPATIENT)
Dept: ORTHOPEDIC SURGERY | Facility: CLINIC | Age: 83
End: 2018-09-04

## 2018-09-04 VITALS — BODY MASS INDEX: 25.95 KG/M2 | WEIGHT: 152 LBS | HEIGHT: 64 IN

## 2018-09-04 DIAGNOSIS — M17.12 PRIMARY OSTEOARTHRITIS OF LEFT KNEE: Primary | ICD-10-CM

## 2018-09-04 PROCEDURE — 20610 DRAIN/INJ JOINT/BURSA W/O US: CPT | Performed by: NURSE PRACTITIONER

## 2018-09-04 NOTE — PROGRESS NOTES
Procedure   Large Joint Arthrocentesis  Date/Time: 9/4/2018 10:42 AM  Consent given by: patient  Site marked: site marked  Timeout: Immediately prior to procedure a time out was called to verify the correct patient, procedure, equipment, support staff and site/side marked as required   Supporting Documentation  Indications: pain   Procedure Details  Location: knee - L knee  Preparation: Patient was prepped and draped in the usual sterile fashion  Needle size: 22 G  Approach: anterolateral  Medications administered: 30 mg Hyaluronan 30 MG/2ML  Patient tolerance: patient tolerated the procedure well with no immediate complications          Patient presented today for viscosupplement injection.  This is the third injection for the left knee.  We will see patient back in six weeks.  We reviewed risks benefits and alternatives of the procedure and patient wished to proceed today and had all questions answered.

## 2018-12-11 ENCOUNTER — OFFICE VISIT (OUTPATIENT)
Dept: ORTHOPEDIC SURGERY | Facility: CLINIC | Age: 83
End: 2018-12-11

## 2018-12-11 DIAGNOSIS — M19.012 PRIMARY OSTEOARTHRITIS, LEFT SHOULDER: ICD-10-CM

## 2018-12-11 DIAGNOSIS — R52 PAIN: Primary | ICD-10-CM

## 2018-12-11 DIAGNOSIS — M17.12 PRIMARY OSTEOARTHRITIS OF LEFT KNEE: ICD-10-CM

## 2018-12-11 PROCEDURE — 99213 OFFICE O/P EST LOW 20 MIN: CPT | Performed by: NURSE PRACTITIONER

## 2018-12-11 PROCEDURE — 20610 DRAIN/INJ JOINT/BURSA W/O US: CPT | Performed by: NURSE PRACTITIONER

## 2018-12-11 PROCEDURE — 73030 X-RAY EXAM OF SHOULDER: CPT | Performed by: NURSE PRACTITIONER

## 2018-12-11 RX ADMIN — LIDOCAINE HYDROCHLORIDE 4 ML: 10 INJECTION, SOLUTION EPIDURAL; INFILTRATION; INTRACAUDAL; PERINEURAL at 15:12

## 2018-12-11 RX ADMIN — LIDOCAINE HYDROCHLORIDE 8 ML: 10 INJECTION, SOLUTION EPIDURAL; INFILTRATION; INTRACAUDAL; PERINEURAL at 15:11

## 2018-12-11 RX ADMIN — TRIAMCINOLONE ACETONIDE 80 MG: 40 INJECTION, SUSPENSION INTRA-ARTICULAR; INTRAMUSCULAR at 15:11

## 2018-12-11 RX ADMIN — TRIAMCINOLONE ACETONIDE 80 MG: 40 INJECTION, SUSPENSION INTRA-ARTICULAR; INTRAMUSCULAR at 15:12

## 2018-12-11 NOTE — PROGRESS NOTES
Subjective:     Patient ID: Coby Castañeda is a 89 y.o. female.    Chief Complaint:  Follow-up primary osteoarthritis left knee  New onset pain left shoulder   History of Present Illness  Coby Castañeda presents back to clinic for follow-up left knee pain and new onset left shoulder pain.  Denies injury to left shoulder.  Maximal tenderness over the lateral acromion.  Increased pain noted when reaching out to the side, reaching out in front and lifting above head.  She does experience decreased range of motion secondary to pain the left shoulder.  She does use a walker therefore, lies on both upper extremities for strength.  Denies that she is experiencing numbness or tingling radiating down the left upper extremity.  Denies topical heat nor cold for symptom relief.  Daughter also reports patient is experiencing pain at the left knee.  Increased pain noted when she is getting out of a chair and attempting to walk.  She does continue to walk with a walker as previously discussed which is helpful.  Received Visco supplementation injections last 9 2018 which again have been helpful.  Denies that she is experiencing pain at the right knee.  Denies knee is giving out on her.  Denies other concerns present at this time.       Social History     Occupational History   • Not on file   Tobacco Use   • Smoking status: Never Smoker   • Smokeless tobacco: Never Used   Substance and Sexual Activity   • Alcohol use: No   • Drug use: No   • Sexual activity: Defer      Past Medical History:   Diagnosis Date   • Cancer (CMS/HCC)     skin cancer on arm   • Fracture of wrist    • Fracture, femur (CMS/HCC)    • Heart failure (CMS/HCC)    • Knee swelling    • Myocardial infarction (CMS/HCC)      Past Surgical History:   Procedure Laterality Date   • ADENOIDECTOMY     • CHOLECYSTECTOMY     • FRACTURE SURGERY     • OOPHORECTOMY     • PACEMAKER IMPLANTATION     • REPLACEMENT TOTAL KNEE Right    • TONSILLECTOMY         Family History    Problem Relation Age of Onset   • Heart failure Mother          Review of Systems   Constitutional: Negative for chills, diaphoresis, fever and unexpected weight change.   HENT: Negative for hearing loss, nosebleeds, sore throat and tinnitus.    Eyes: Negative for pain and visual disturbance.   Respiratory: Negative for cough, shortness of breath and wheezing.    Cardiovascular: Negative for chest pain and palpitations.   Gastrointestinal: Negative for abdominal pain, diarrhea, nausea and vomiting.   Endocrine: Negative for cold intolerance, heat intolerance and polydipsia.   Genitourinary: Negative for difficulty urinating, dysuria and hematuria.   Musculoskeletal: Positive for arthralgias. Negative for joint swelling and myalgias.   Skin: Negative for rash and wound.   Allergic/Immunologic: Negative for environmental allergies.   Neurological: Negative for dizziness, syncope and numbness.   Hematological: Does not bruise/bleed easily.   Psychiatric/Behavioral: Negative for dysphoric mood and sleep disturbance. The patient is not nervous/anxious.    All other systems reviewed and are negative.          Objective:  Physical Exam    General: No acute distress.  Eyes: conjunctiva clear; pupils equally round and reactive  ENT: external ears and nose atraumatic; oropharynx clear  CV: no peripheral edema  Resp: normal respiratory effort  Skin: no rashes or wounds; normal turgor  Psych: mood and affect appropriate; recent and remote memory intact    There were no vitals filed for this visit.  There were no vitals filed for this visit.  There is no height or weight on file to calculate BMI.      Left Knee Exam     Tenderness   The patient is experiencing tenderness in the medial joint line and lateral joint line.    Range of Motion   Extension: 5   Flexion: 110     Tests   Josie:  Medial - negative Lateral - negative  Varus: negative Valgus: negative  Lachman:  Anterior - 1+    Posterior - negative    Other    Erythema: absent  Sensation: normal  Pulse: present  Swelling: moderate    Comments:  Positive crepitus throughout arc of motion      Left Shoulder Exam     Tenderness   The patient is experiencing tenderness in the acromion.    Range of Motion   External rotation: 40   Forward flexion: 160     Muscle Strength   Internal rotation: 4/5   External rotation: 4/5   Supraspinatus: 4/5   Subscapularis: 4/5   Biceps: 4/5     Tests   Fuller test: positive  Cross arm: negative  Drop arm: negative  Sulcus: absent    Other   Erythema: absent  Scars: absent  Sensation: normal  Pulse: present     Comments:  Negative bear hug exam                 Imaging:  Left Shoulder X-Ray  Indication: Pain  AP Internal and External Rotation views    Findings:  No fracture  Normal soft tissues  Moderate-advanced glenohumeral osteoarthritis, AC joint arthritis     No prior studies were available for comparison.    Large Joint Arthrocentesis: L knee  Date/Time: 12/11/2018 3:11 PM  Consent given by: patient  Site marked: site marked  Timeout: Immediately prior to procedure a time out was called to verify the correct patient, procedure, equipment, support staff and site/side marked as required   Supporting Documentation  Indications: pain   Procedure Details  Location: knee - L knee  Preparation: Patient was prepped and draped in the usual sterile fashion  Needle size: 22 G  Approach: anterolateral  Medications administered: 8 mL lidocaine PF 1% 1 %; 80 mg triamcinolone acetonide 40 MG/ML  Patient tolerance: patient tolerated the procedure well with no immediate complications          Assessment:        1. Pain    2. Primary osteoarthritis, left shoulder    3. Primary osteoarthritis of left knee           Plan:  1. Discussed plan of care with patient and her daughter. Wishes to proceed with corticosteroid injection left shoulder, subacromial aspect and left knee. Will plan to see her back in approximately 3 months, prn. Patient verbalized  understanding of all information and agrees with plan of care. Denies all other concerns present at this time.     Orders:  Orders Placed This Encounter   Procedures   • Large Joint Arthrocentesis: L knee   • Large Joint Arthrocentesis: L subacromial bursa   • XR Shoulder 2+ View Left     Dictated utilizing Dragon dictation

## 2018-12-11 NOTE — PROGRESS NOTES
Procedure   Large Joint Arthrocentesis: L subacromial bursa  Date/Time: 12/11/2018 3:12 PM  Consent given by: patient  Site marked: site marked  Timeout: Immediately prior to procedure a time out was called to verify the correct patient, procedure, equipment, support staff and site/side marked as required   Supporting Documentation  Indications: pain   Procedure Details  Location: shoulder - L subacromial bursa  Preparation: Patient was prepped and draped in the usual sterile fashion  Needle size: 22 G  Approach: lateral  Medications administered: 4 mL lidocaine PF 1% 1 %; 80 mg triamcinolone acetonide 40 MG/ML  Patient tolerance: patient tolerated the procedure well with no immediate complications

## 2018-12-13 RX ORDER — TRIAMCINOLONE ACETONIDE 40 MG/ML
80 INJECTION, SUSPENSION INTRA-ARTICULAR; INTRAMUSCULAR
Status: COMPLETED | OUTPATIENT
Start: 2018-12-11 | End: 2018-12-11

## 2018-12-13 RX ORDER — LIDOCAINE HYDROCHLORIDE 10 MG/ML
8 INJECTION, SOLUTION EPIDURAL; INFILTRATION; INTRACAUDAL; PERINEURAL
Status: COMPLETED | OUTPATIENT
Start: 2018-12-11 | End: 2018-12-11

## 2018-12-13 RX ORDER — LIDOCAINE HYDROCHLORIDE 10 MG/ML
4 INJECTION, SOLUTION EPIDURAL; INFILTRATION; INTRACAUDAL; PERINEURAL
Status: COMPLETED | OUTPATIENT
Start: 2018-12-11 | End: 2018-12-11

## 2019-01-09 RX ORDER — MELOXICAM 7.5 MG/1
TABLET ORAL
Qty: 90 TABLET | Refills: 1 | Status: SHIPPED | OUTPATIENT
Start: 2019-01-09